# Patient Record
Sex: MALE | Race: WHITE | URBAN - METROPOLITAN AREA
[De-identification: names, ages, dates, MRNs, and addresses within clinical notes are randomized per-mention and may not be internally consistent; named-entity substitution may affect disease eponyms.]

---

## 2018-07-14 ENCOUNTER — OFFICE VISIT (OUTPATIENT)
Dept: URGENT CARE | Facility: URGENT CARE | Age: 33
End: 2018-07-14
Payer: COMMERCIAL

## 2018-07-14 VITALS
HEART RATE: 109 BPM | DIASTOLIC BLOOD PRESSURE: 94 MMHG | SYSTOLIC BLOOD PRESSURE: 133 MMHG | OXYGEN SATURATION: 97 % | WEIGHT: 178 LBS | TEMPERATURE: 99.6 F

## 2018-07-14 DIAGNOSIS — S91.111A LACERATION OF RIGHT GREAT TOE WITHOUT FOREIGN BODY PRESENT OR DAMAGE TO NAIL, INITIAL ENCOUNTER: Primary | ICD-10-CM

## 2018-07-14 PROCEDURE — 12001 RPR S/N/AX/GEN/TRNK 2.5CM/<: CPT | Performed by: PHYSICIAN ASSISTANT

## 2018-07-14 NOTE — MR AVS SNAPSHOT
After Visit Summary   7/14/2018    Kolton Olivares    MRN: [id number]           Patient Information     Date Of Birth          1985        Visit Information        Provider Department      7/14/2018 8:10 PM Nathalie Lane PA-C [facility]        Today's Diagnoses     Laceration of right great toe without foreign body present or damage to nail, initial encounter    -  1       Follow-ups after your visit        Who to contact     If you have questions or need follow up information about today's clinic visit or your schedule please contact [facility] directly at [phone].  Normal or non-critical lab and imaging results will be communicated to you by MyChart, letter or phone within 4 business days after the clinic has received the results. If you do not hear from us within 7 days, please contact the clinic through MyChart or phone. If you have a critical or abnormal lab result, we will notify you by phone as soon as possible.  Submit refill requests through [employer] or call your pharmacy and they will forward the refill request to us. Please allow 3 business days for your refill to be completed.          Additional Information About Your Visit        Care EveryWhere ID     This is your Care EveryWhere ID. This could be used by other organizations to access your [place] medical records  [id number]        Your Vitals Were     Pulse Temperature Pulse Oximetry             109 99.6  F (37.6  C) (Oral) 97%          Blood Pressure from Last 3 Encounters:   07/14/18 (!) 133/94    Weight from Last 3 Encounters:   07/14/18 178 lb (80.7 kg)              We Performed the Following     REPAIR SUPERFICIAL, WOUND BODY < =2.5CM        Primary Care Provider Fax #    Physician No Ref-Primary [phone]       No address on file        Equal Access to Services     BJ GARZA : Enrico Elizabeth, rosalinda parrish, bre steiner  raul hernandez ah. [place] [phone].    ATENCIÓN: Si habla katey, tiene a zuirta disposición servicios gratuitos de asistencia lingüística. Beckie al [phone].    We comply with applicable federal civil rights laws and [place] laws. We do not discriminate on the basis of race, color, national origin, age, disability, sex, sexual orientation, or gender identity.            Thank you!     Thank you for choosing [facility]  for your care. Our goal is always to provide you with excellent care. Hearing back from our patients is one way we can continue to improve our services. Please take a few minutes to complete the written survey that you may receive in the mail after your visit with us. Thank you!             Your Updated Medication List - Protect others around you: Learn how to safely use, store and throw away your medicines at www.disposemymeds.org.      Notice  As of 7/14/2018 10:34 PM    You have not been prescribed any medications.

## 2018-07-15 NOTE — PROGRESS NOTES
SUBJECTIVE:     Chief Complaint   Patient presents with     Urgent Care     Laceration     start today sx right bit toe brushed the edge of stairs/curb concrete- bleeding, scraping, litte pain with weight tx soaked proply alcohol, wrapped it      Kolton Olivares is a 33 year old male who presents to the clinic with a laceration on the underside of right  Great toe sustained earlier today.  This is a non-work related, self-inflicted and accidental injury.    Mechanism of injury: brushed on the edge of concrete curb and tore the bottom of the toe.  Moderate bleeding that has stopped.    Associated symptoms: Denies numbness, weakness, or loss of function  Last tetanus booster within 10 years: yes    EXAM:   The patient appears today in alert,no apparent distress distress  VITALS: BP (!) 133/94 (BP Location: Right arm, Patient Position: Chair, Cuff Size: Adult Regular)  Pulse 109  Temp 99.6  F (37.6  C) (Oral)  Wt 178 lb (80.7 kg)  SpO2 97%    Size of laceration: 2 centimeters  Characteristics of the laceration: flap type, semi-circular on bottom of great right toe.  No bleeding.  Flap is sealed back down in good position with only edges overlapping.    Tendon function intact: yes  Sensation to light touch intact: yes  Pulses intact: yes  Picture included in patient's chart: no    Assessment:  Laceration of right great toe without foreign body present or damage to nail, initial encounter    PLAN:  PROCEDURE NOTE::    Wound cleaned with HIBICLENS  Wound soaked  Dermabond was applied after overlying edges trimmed with curved iris scissors.  No bleeding and tolerated well  After care instructions:  Keep wound clean and dry for the next 24-48 hours  Signs of infection discussed today  May return to work as long as wound is kept clean and dry  Keep pad of toe protected as to not tear dermabond off